# Patient Record
Sex: MALE
[De-identification: names, ages, dates, MRNs, and addresses within clinical notes are randomized per-mention and may not be internally consistent; named-entity substitution may affect disease eponyms.]

---

## 2018-03-13 ENCOUNTER — CHARTING TRANS (OUTPATIENT)
Dept: OTHER | Age: 38
End: 2018-03-13

## 2018-03-15 ENCOUNTER — CHARTING TRANS (OUTPATIENT)
Dept: OTHER | Age: 38
End: 2018-03-15

## 2018-11-01 VITALS — TEMPERATURE: 98.5 F

## 2018-11-01 VITALS
RESPIRATION RATE: 18 BRPM | DIASTOLIC BLOOD PRESSURE: 86 MMHG | HEART RATE: 87 BPM | WEIGHT: 315 LBS | BODY MASS INDEX: 42.66 KG/M2 | OXYGEN SATURATION: 96 % | TEMPERATURE: 99.2 F | HEIGHT: 72 IN | SYSTOLIC BLOOD PRESSURE: 132 MMHG

## 2020-12-01 ENCOUNTER — HOSPITAL ENCOUNTER (OUTPATIENT)
Age: 40
Discharge: ANOTHER HOSPITAL | End: 2020-12-01
Payer: COMMERCIAL

## 2020-12-01 VITALS
HEART RATE: 113 BPM | WEIGHT: 315 LBS | HEIGHT: 73 IN | BODY MASS INDEX: 41.75 KG/M2 | TEMPERATURE: 98 F | SYSTOLIC BLOOD PRESSURE: 145 MMHG | DIASTOLIC BLOOD PRESSURE: 92 MMHG | OXYGEN SATURATION: 100 % | RESPIRATION RATE: 20 BRPM

## 2020-12-01 DIAGNOSIS — N50.82 SCROTAL PAIN: ICD-10-CM

## 2020-12-01 DIAGNOSIS — R00.0 TACHYCARDIA: ICD-10-CM

## 2020-12-01 DIAGNOSIS — N50.89 SCROTAL SWELLING: Primary | ICD-10-CM

## 2020-12-01 DIAGNOSIS — M54.41 MIDLINE LOW BACK PAIN WITH RIGHT-SIDED SCIATICA, UNSPECIFIED CHRONICITY: ICD-10-CM

## 2020-12-01 PROCEDURE — 93000 ELECTROCARDIOGRAM COMPLETE: CPT | Performed by: NURSE PRACTITIONER

## 2020-12-01 PROCEDURE — 99202 OFFICE O/P NEW SF 15 MIN: CPT | Performed by: NURSE PRACTITIONER

## 2020-12-01 RX ORDER — TRAZODONE HYDROCHLORIDE 50 MG/1
50 TABLET ORAL NIGHTLY
COMMUNITY
Start: 2020-11-25

## 2020-12-01 RX ORDER — OXYCODONE HYDROCHLORIDE 5 MG/1
TABLET ORAL
COMMUNITY
Start: 2020-11-16

## 2020-12-01 NOTE — ED INITIAL ASSESSMENT (HPI)
Per pt having lower back pain that radiates down right leg with burning sensation. Pt reports pain has been going on since march but for the last for weeks pain is worse and started to affect gait.  Pt also reports recent surgery to groin area that seems to

## 2020-12-01 NOTE — ED PROVIDER NOTES
Patient Seen in: [facility]      History   Patient presents with:  Back Pain    Stated Complaint: UPPER/LOWER BACK PAIN    HPI    This is a 39-year-old male presenting with back pain scrotal pain and swelling.   Patient states, that he recent Mouth/Throat:      Mouth: Mucous membranes are moist.      Pharynx: Oropharynx is clear. Eyes:      Extraocular Movements: Extraocular movements intact.       Conjunctiva/sclera: Conjunctivae normal.      Pupils: Pupils are equal, round, and reactive to l Curly Goldmann.  Patient now agrees to go to [facility] ER for further evaluation. Discussed EKG with patient. Patient declined EMS. Patient feels comfortable with driving to the hospital.    This patient was discussed with Dr. Callum Mccormack.

## 2020-12-01 NOTE — ED NOTES
Per provider recommendation pt will be going to [facility] ED for further evaluation of symptoms. Pt leaving IC stable no acute distress noted.